# Patient Record
Sex: MALE | Race: WHITE
[De-identification: names, ages, dates, MRNs, and addresses within clinical notes are randomized per-mention and may not be internally consistent; named-entity substitution may affect disease eponyms.]

---

## 2018-04-17 ENCOUNTER — HOSPITAL ENCOUNTER (OUTPATIENT)
Dept: HOSPITAL 89 - RAD | Age: 33
End: 2018-04-17
Attending: INTERNAL MEDICINE
Payer: MEDICAID

## 2018-04-17 DIAGNOSIS — N43.3: Primary | ICD-10-CM

## 2018-04-17 DIAGNOSIS — N44.2: ICD-10-CM

## 2018-04-17 DIAGNOSIS — M25.471: ICD-10-CM

## 2018-04-17 PROCEDURE — 76870 US EXAM SCROTUM: CPT

## 2018-04-17 NOTE — RADIOLOGY IMAGING REPORT
FACILITY: [facility] 

 

PATIENT NAME: Mike Washington

: 1985

MR: [id number]

V: [id number]

EXAM DATE: [id number]

ORDERING PHYSICIAN: MAYRA BERNABE

TECHNOLOGIST: 

 

Location: [facility]

Patient: Mike Washington

: 1985

MRN: [id number]

Visit/Account:[id number]

Date of Sevice:  2018

 

ACCESSION #: [id number]

 

Exam type: ANKLE 2 VIEW RIGHT

 

History: ankle sprain

 

Comparison: None.

 

Findings:

 

There is no acute fracture of the right ankle.  Mild soft tissue swelling around the lateral malleolu
s is noted.  Ankle mortise aligns appropriately.  Calcaneal heel spurs are noted.

 

IMPRESSION:

 

1.  Mild soft tissue swelling around the lateral malleolus but no acute fracture.

 

Report Dictated By: Thomas Dunphy, MD at 2018 2:23 PM

 

Report E-Signed By: Thomas Dunphy, MD  at 2018 2:24 PM

 

WSN:[id number]

## 2018-04-18 ENCOUNTER — HOSPITAL ENCOUNTER (OUTPATIENT)
Dept: HOSPITAL 89 - LAB | Age: 33
End: 2018-04-18
Attending: INTERNAL MEDICINE
Payer: MEDICAID

## 2018-04-18 DIAGNOSIS — Z00.00: Primary | ICD-10-CM

## 2018-04-18 DIAGNOSIS — S93.411D: ICD-10-CM

## 2018-04-18 DIAGNOSIS — N50.811: ICD-10-CM

## 2018-04-18 LAB
LDLC SERPL-MCNC: 111 MG/DL
PLATELET COUNT, AUTOMATED: 239 K/UL (ref 150–450)

## 2018-04-18 PROCEDURE — 82947 ASSAY GLUCOSE BLOOD QUANT: CPT

## 2018-04-18 PROCEDURE — 84460 ALANINE AMINO (ALT) (SGPT): CPT

## 2018-04-18 PROCEDURE — 81001 URINALYSIS AUTO W/SCOPE: CPT

## 2018-04-18 PROCEDURE — 82565 ASSAY OF CREATININE: CPT

## 2018-04-18 PROCEDURE — 84155 ASSAY OF PROTEIN SERUM: CPT

## 2018-04-18 PROCEDURE — 84450 TRANSFERASE (AST) (SGOT): CPT

## 2018-04-18 PROCEDURE — 84075 ASSAY ALKALINE PHOSPHATASE: CPT

## 2018-04-18 PROCEDURE — 84132 ASSAY OF SERUM POTASSIUM: CPT

## 2018-04-18 PROCEDURE — 82310 ASSAY OF CALCIUM: CPT

## 2018-04-18 PROCEDURE — 84443 ASSAY THYROID STIM HORMONE: CPT

## 2018-04-18 PROCEDURE — 84478 ASSAY OF TRIGLYCERIDES: CPT

## 2018-04-18 PROCEDURE — 84520 ASSAY OF UREA NITROGEN: CPT

## 2018-04-18 PROCEDURE — 84295 ASSAY OF SERUM SODIUM: CPT

## 2018-04-18 PROCEDURE — 82247 BILIRUBIN TOTAL: CPT

## 2018-04-18 PROCEDURE — 83718 ASSAY OF LIPOPROTEIN: CPT

## 2018-04-18 PROCEDURE — 82040 ASSAY OF SERUM ALBUMIN: CPT

## 2018-04-18 PROCEDURE — 82435 ASSAY OF BLOOD CHLORIDE: CPT

## 2018-04-18 PROCEDURE — 36415 COLL VENOUS BLD VENIPUNCTURE: CPT

## 2018-04-18 PROCEDURE — 82465 ASSAY BLD/SERUM CHOLESTEROL: CPT

## 2018-04-18 PROCEDURE — 85025 COMPLETE CBC W/AUTO DIFF WBC: CPT

## 2018-04-18 PROCEDURE — 82374 ASSAY BLOOD CARBON DIOXIDE: CPT

## 2018-04-18 NOTE — RADIOLOGY IMAGING REPORT
FACILITY: [facility] 

 

PATIENT NAME: Mike Washington

: 1985

MR: [id number]

V: [id number]

EXAM DATE: [id number]

ORDERING PHYSICIAN: MAYRA BERNABE

TECHNOLOGIST: 

 

[place] [facility] [place]

Patient: Mike Washington

: 1985

MRN: [id number]

Visit/Account:[id number]

Date of Sevice:  2018

 

ACCESSION #: [id number]

 

 

EXAMINATION: SCROTAL ULTRASOUND

 

DATE: 2018 4:08 PM.

 

INDICATION: Right scrotal pain.

 

TECHNIQUE: Grayscale, color, and pulsed Doppler ultrasound images of the scrotum is performed.

 

COMPARISON: None.

 

FINDINGS:

 

Both testicles are symmetric and normal in echogenicity and size.

 

The right testicle measures 5.1 x 1.9 x 2.9 cm in size and demonstrates normal homogeneous echogenici
ty. No focal lesion is seen in the right testicle. The right epididymis is unremarkable. The arterial
 inflow to the right testicle has normal low resistance.

 

The left testicle measures 5.4 x 2.3 x 3.2 cm in size and demonstrates normal homogeneous echogenicit
y. No focal mass is seen in the left testicle.  Simple cyst in the periphery of the midportion of the
 testicle measures 2 mm in diameter.  Simple cyst in the left epididymal head measures 11 mm in diame
ter. The arterial inflow to the left testicle has normal low resistance.

 

Bilateral small volume hydroceles.

 

IMPRESSION:

 

1.  No acute abnormality.

 

2.  Bilateral small volume hydroceles.

 

3.  Left-sided epididymal and testicular cysts of doubtful significance.

 

Report Dictated By: Ren Shearer MD at 2018 12:11 AM

 

Report E-Signed By: Ren Shearer MD  at 2018 12:22 AM

 

WSN:[id number]